# Patient Record
Sex: MALE | Race: OTHER | HISPANIC OR LATINO | URBAN - METROPOLITAN AREA
[De-identification: names, ages, dates, MRNs, and addresses within clinical notes are randomized per-mention and may not be internally consistent; named-entity substitution may affect disease eponyms.]

---

## 2023-11-01 ENCOUNTER — EMERGENCY (EMERGENCY)
Facility: HOSPITAL | Age: 43
LOS: 1 days | Discharge: HOME | End: 2023-11-01
Attending: EMERGENCY MEDICINE
Payer: MEDICAID

## 2023-11-01 VITALS
HEART RATE: 91 BPM | DIASTOLIC BLOOD PRESSURE: 97 MMHG | RESPIRATION RATE: 20 BRPM | WEIGHT: 199.96 LBS | TEMPERATURE: 98 F | HEIGHT: 65 IN | OXYGEN SATURATION: 95 % | SYSTOLIC BLOOD PRESSURE: 156 MMHG

## 2023-11-01 PROCEDURE — 99223 1ST HOSP IP/OBS HIGH 75: CPT

## 2023-11-01 PROCEDURE — 72148 MRI LUMBAR SPINE W/O DYE: CPT | Mod: 26,MA

## 2023-11-01 PROCEDURE — 72131 CT LUMBAR SPINE W/O DYE: CPT | Mod: 26,MA

## 2023-11-01 RX ORDER — GABAPENTIN 400 MG/1
300 CAPSULE ORAL ONCE
Refills: 0 | Status: COMPLETED | OUTPATIENT
Start: 2023-11-01 | End: 2023-11-01

## 2023-11-01 RX ORDER — KETOROLAC TROMETHAMINE 30 MG/ML
30 SYRINGE (ML) INJECTION EVERY 6 HOURS
Refills: 0 | Status: COMPLETED | OUTPATIENT
Start: 2023-11-01 | End: 2023-11-06

## 2023-11-01 RX ORDER — OXYCODONE HYDROCHLORIDE 5 MG/1
10 TABLET ORAL ONCE
Refills: 0 | Status: DISCONTINUED | OUTPATIENT
Start: 2023-11-01 | End: 2023-11-01

## 2023-11-01 RX ORDER — ACETAMINOPHEN 500 MG
975 TABLET ORAL EVERY 6 HOURS
Refills: 0 | Status: DISCONTINUED | OUTPATIENT
Start: 2023-11-01 | End: 2023-11-08

## 2023-11-01 RX ORDER — ACETAMINOPHEN 500 MG
1000 TABLET ORAL ONCE
Refills: 0 | Status: COMPLETED | OUTPATIENT
Start: 2023-11-01 | End: 2023-11-01

## 2023-11-01 RX ORDER — METHOCARBAMOL 500 MG/1
750 TABLET, FILM COATED ORAL EVERY 8 HOURS
Refills: 0 | Status: DISCONTINUED | OUTPATIENT
Start: 2023-11-01 | End: 2023-11-08

## 2023-11-01 RX ORDER — GABAPENTIN 400 MG/1
300 CAPSULE ORAL THREE TIMES A DAY
Refills: 0 | Status: DISCONTINUED | OUTPATIENT
Start: 2023-11-01 | End: 2023-11-08

## 2023-11-01 RX ORDER — DEXAMETHASONE 0.5 MG/5ML
10 ELIXIR ORAL ONCE
Refills: 0 | Status: COMPLETED | OUTPATIENT
Start: 2023-11-01 | End: 2023-11-01

## 2023-11-01 RX ORDER — LIDOCAINE 4 G/100G
1 CREAM TOPICAL ONCE
Refills: 0 | Status: COMPLETED | OUTPATIENT
Start: 2023-11-01 | End: 2023-11-01

## 2023-11-01 RX ORDER — KETOROLAC TROMETHAMINE 30 MG/ML
30 SYRINGE (ML) INJECTION ONCE
Refills: 0 | Status: DISCONTINUED | OUTPATIENT
Start: 2023-11-01 | End: 2023-11-01

## 2023-11-01 RX ORDER — DIAZEPAM 5 MG
5 TABLET ORAL ONCE
Refills: 0 | Status: DISCONTINUED | OUTPATIENT
Start: 2023-11-01 | End: 2023-11-01

## 2023-11-01 RX ADMIN — GABAPENTIN 300 MILLIGRAM(S): 400 CAPSULE ORAL at 08:45

## 2023-11-01 RX ADMIN — Medication 30 MILLIGRAM(S): at 07:31

## 2023-11-01 RX ADMIN — OXYCODONE HYDROCHLORIDE 10 MILLIGRAM(S): 5 TABLET ORAL at 10:10

## 2023-11-01 RX ADMIN — Medication 30 MILLIGRAM(S): at 17:08

## 2023-11-01 RX ADMIN — Medication 30 MILLIGRAM(S): at 23:52

## 2023-11-01 RX ADMIN — Medication 5 MILLIGRAM(S): at 07:30

## 2023-11-01 RX ADMIN — LIDOCAINE 1 PATCH: 4 CREAM TOPICAL at 07:31

## 2023-11-01 RX ADMIN — GABAPENTIN 300 MILLIGRAM(S): 400 CAPSULE ORAL at 23:14

## 2023-11-01 RX ADMIN — Medication 30 MILLIGRAM(S): at 08:37

## 2023-11-01 RX ADMIN — GABAPENTIN 300 MILLIGRAM(S): 400 CAPSULE ORAL at 07:31

## 2023-11-01 RX ADMIN — Medication 400 MILLIGRAM(S): at 07:30

## 2023-11-01 RX ADMIN — OXYCODONE HYDROCHLORIDE 10 MILLIGRAM(S): 5 TABLET ORAL at 08:45

## 2023-11-01 RX ADMIN — Medication 1000 MILLIGRAM(S): at 07:57

## 2023-11-01 RX ADMIN — Medication 1000 MILLIGRAM(S): at 08:37

## 2023-11-01 RX ADMIN — Medication 10 MILLIGRAM(S): at 08:32

## 2023-11-01 RX ADMIN — GABAPENTIN 300 MILLIGRAM(S): 400 CAPSULE ORAL at 13:56

## 2023-11-01 RX ADMIN — LIDOCAINE 1 PATCH: 4 CREAM TOPICAL at 19:20

## 2023-11-01 RX ADMIN — METHOCARBAMOL 750 MILLIGRAM(S): 500 TABLET, FILM COATED ORAL at 13:57

## 2023-11-01 RX ADMIN — Medication 102 MILLIGRAM(S): at 07:52

## 2023-11-01 NOTE — ED PROVIDER NOTE - CARE PROVIDER_API CALL
Laury Grover  Pain Medicine  [address] [place] [address], [address] [place] [address]  Phone: [phone]  Fax: [phone]  Follow Up Time:

## 2023-11-01 NOTE — ED PROVIDER NOTE - PROGRESS NOTE DETAILS
CT +herniated discs, d/w patient results, reports pain is improved from 10/10 to 7/10 after multiple modalities of pain control.  Pain management consult placed, MD Grover recommending MRI.

## 2023-11-01 NOTE — CONSULT NOTE ADULT - ASSESSMENT
43M  acute back and leg pain x 2 weeks  back pain likely sacroilitis  leg pain likely lumbar radiculopathy, mri pending      - Recommend starting acetaminophen PO 975mg [id number] standing. HOLD for liver function test dysfunction  - Recommend starting gabapentin PO 300mg [id number] standing. HOLD for sedation  - If no contraindication to NSAIDs, recommend starting ketorolac IV 30mg [id number] standing x 4 doses.   - Recommend starting robaxin 750mg q8h standing. HOLD for sedation

## 2023-11-01 NOTE — ED ADULT NURSE NOTE - OBJECTIVE STATEMENT
Pt is A&Ox4. Respirations are even and unlabored. Color is appropriate for race. Skin warm and dry. Pt reports right sided back pain radiating to right leg for a week.  Denies trauma/injury to area. Last Motrin 0300.  Patient unable sit on right side.  Ambulates with limp. ED provider evaluating, plan of care ongoing.

## 2023-11-01 NOTE — ED PROVIDER NOTE - PHYSICAL EXAMINATION
Constitutional - well-developed; well nourished. Head - NCAT. Airway patent. Eyes - PERRL. CV - RRR. no murmur. no edema. Pulm - CTAB. Abd - soft, nt. no rebound. no guarding. Neuro - A&Ox3. strength 5/5 x4. sensation intact x4. normal gait. Skin - No rash. MSK - +TTP to R paraspinal lumbar spine, no midline tenderness, no skin changes, able to walk, no pain with lifting up legs, motor and sensory sensation intact, DP and PT pulses intact, calf soft NT, normal ROM.

## 2023-11-01 NOTE — ED CDU PROVIDER INITIAL DAY NOTE - ATTENDING APP SHARED VISIT CONTRIBUTION OF CARE
I agree with the PA's note and was available for any issues/concerns. I was directly involved in patient care. My brief overall assessment is as follows:    43 year old male PMHx herniated discs c/o back pain; initial work up reviewed; admit to obs for pain control and MRI

## 2023-11-01 NOTE — ED PROVIDER NOTE - CLINICAL SUMMARY MEDICAL DECISION MAKING FREE TEXT BOX
back pain, neurologically intact: pain control, as patient reports 10/10, ambulatory  if pain improved likely can discharge on oral pain regimen with steroids and muscle relaxants  suspect radiculopathy vs herniated discs  refer to spine and pain management as outpatient back pain, neurologically intact: pain control, as patient reports 10/10, ambulatory  if pain improved likely can discharge on oral pain regimen with steroids and muscle relaxants  suspect radiculopathy vs herniated discs  refer to spine and pain management as outpatient    patient reporting increased pain, improved from 10/10-7/10  added on oxycodone and more gabapentin  pain management consult placed, advised MRI, will place in observation for possible MELY

## 2023-11-01 NOTE — ED CDU PROVIDER INITIAL DAY NOTE - CLINICAL SUMMARY MEDICAL DECISION MAKING FREE TEXT BOX
back pain with herniated disc, received multiple drug modalities for pain in ED reports pain improved from 10/10-7/10  pain management consult placed, as per MD Grover recommends MRI for possible MELY

## 2023-11-01 NOTE — ED ADULT TRIAGE NOTE - CHIEF COMPLAINT QUOTE
Patient presents to ED with c/o right sided back pain radiating to right leg times 5 days.  Denies trauma/injury Last Motrin 0300  Patient unable sit on right side.  Ambulates in triage with limp.

## 2023-11-01 NOTE — ED CDU PROVIDER INITIAL DAY NOTE - OBJECTIVE STATEMENT
This is a 43 year old male here for lower back pain radiating down right leg x 1 week.  Denies any trauma or falls.  He denies any h/o cancer or steroid use.  He has been taking tylenol and motrin with no relief and gave himself an injection x 3 days ago, called dolor nebulion from [place].  He reports no loss in bowel or bladder dysfunction.  He notes no fevers, chills, n/v/d or any recent travel or rashes.  He son drove him to the ED.  He reports pain increases with movement and laying/sitting for prolonged amount of time.  He reports issues with back in past, never formally evaluated by spine or pain management.  He denies any pmhx or shx.  He denies any allergies. This is a 43 year old male here for lower back pain radiating down right leg x 1 week.  Denies any trauma or falls.  He denies any h/o cancer or steroid use.  He has been taking tylenol and motrin with no relief and gave himself an injection x 3 days ago, called dolor nebulion from [place].  He reports no loss in bowel or bladder dysfunction.  He notes no fevers, chills, n/v/d or any recent travel or rashes.  He son drove him to the ED.  He reports pain increases with movement and laying/sitting for prolonged amount of time.  He reports issues with back in past, never formally evaluated by spine or pain management.  He denies any pmhx or shx.  He denies any allergies.    Patient denies smoking  Family history DM: Father

## 2023-11-01 NOTE — CONSULT NOTE ADULT - SUBJECTIVE AND OBJECTIVE BOX
CC: back pain    HPI: per chart review:  · HPI Objective Statement: This is a 43 year old male here for lower back pain radiating down right leg x 1 week.  Denies any trauma or falls.  He denies any h/o cancer or steroid use.  He has been taking tylenol and motrin with no relief and gave himself an injection x 3 days ago, called unique montero from [place].  He reports no loss in bowel or bladder dysfunction.  He notes no fevers, chills, n/v/d or any recent travel or rashes.  He son drove him to the ED.  He reports pain increases with movement and laying/sitting for prolonged amount of time.  He reports issues with back in past, never formally evaluated by spine or pain management.  He denies any pmhx or shx.  He denies any allergies.    	Patient denies smoking  Family history DM: Father        Analgesic Dosing for past 24 hours reviewed as below:  acetaminophen   IVPB ..   400 mL/Hr IV Intermittent (11-01-23 @ 07:30)    diazepam    Tablet   5 milliGRAM(s) Oral (11-01-23 @ 07:30)    gabapentin   300 milliGRAM(s) Oral (11-01-23 @ 07:31)    gabapentin   300 milliGRAM(s) Oral (11-01-23 @ 08:45)    ketorolac   Injectable   30 milliGRAM(s) IV Push (11-01-23 @ 07:31)    oxyCODONE    IR   10 milliGRAM(s) Oral (11-01-23 @ 08:45)          T(C): 36.6 (11-01-23 @ 10:57), Max: 36.8 (11-01-23 @ 06:12)  HR: 84 (11-01-23 @ 10:57) (84 - 91)  BP: 137/81 (11-01-23 @ 10:57) (137/81 - 156/97)  RR: 20 (11-01-23 @ 10:57) (20 - 20)  SpO2: 97% (11-01-23 @ 10:57) (95% - 97%)        gabapentin 300 milliGRAM(s) Oral three times a day  ketorolac   Injectable 30 milliGRAM(s) IV Push every 6 hours  methocarbamol 750 milliGRAM(s) Oral every 8 hours PRN                  Pain Service   [phone]

## 2023-11-01 NOTE — ED ADULT NURSE NOTE - CAS ELECT INFOMATION PROVIDED
Patient A&Ox4, resp even/unlabored, ambulatory, steady gait noted, discharged home with all belongings. Med rec and discharge instructions explained and given by RICHIE White. Patient verbalizes understanding on physician follow up, medication regimen and next dose, s/s of complications and monitoring. No distress noted./DC instructions

## 2023-11-02 VITALS
RESPIRATION RATE: 18 BRPM | HEART RATE: 78 BPM | SYSTOLIC BLOOD PRESSURE: 117 MMHG | DIASTOLIC BLOOD PRESSURE: 70 MMHG | TEMPERATURE: 98 F | OXYGEN SATURATION: 98 %

## 2023-11-02 PROCEDURE — 96365 THER/PROPH/DIAG IV INF INIT: CPT

## 2023-11-02 PROCEDURE — 96376 TX/PRO/DX INJ SAME DRUG ADON: CPT

## 2023-11-02 PROCEDURE — 99284 EMERGENCY DEPT VISIT MOD MDM: CPT | Mod: 25

## 2023-11-02 PROCEDURE — 72148 MRI LUMBAR SPINE W/O DYE: CPT | Mod: MA

## 2023-11-02 PROCEDURE — 99239 HOSP IP/OBS DSCHRG MGMT >30: CPT

## 2023-11-02 PROCEDURE — 96367 TX/PROPH/DG ADDL SEQ IV INF: CPT

## 2023-11-02 PROCEDURE — 96375 TX/PRO/DX INJ NEW DRUG ADDON: CPT

## 2023-11-02 PROCEDURE — G0378: CPT

## 2023-11-02 PROCEDURE — T1013: CPT

## 2023-11-02 PROCEDURE — 72131 CT LUMBAR SPINE W/O DYE: CPT | Mod: MA

## 2023-11-02 RX ORDER — METHOCARBAMOL 500 MG/1
1 TABLET, FILM COATED ORAL
Qty: 15 | Refills: 0
Start: 2023-11-02 | End: 2023-11-06

## 2023-11-02 RX ORDER — ACETAMINOPHEN 500 MG
3 TABLET ORAL
Qty: 45 | Refills: 0
Start: 2023-11-02 | End: 2023-11-06

## 2023-11-02 RX ORDER — GABAPENTIN 400 MG/1
1 CAPSULE ORAL
Qty: 15 | Refills: 0
Start: 2023-11-02 | End: 2023-11-06

## 2023-11-02 RX ADMIN — METHOCARBAMOL 750 MILLIGRAM(S): 500 TABLET, FILM COATED ORAL at 15:59

## 2023-11-02 RX ADMIN — Medication 30 MILLIGRAM(S): at 12:31

## 2023-11-02 RX ADMIN — GABAPENTIN 300 MILLIGRAM(S): 400 CAPSULE ORAL at 15:58

## 2023-11-02 RX ADMIN — Medication 30 MILLIGRAM(S): at 06:29

## 2023-11-02 RX ADMIN — Medication 30 MILLIGRAM(S): at 00:28

## 2023-11-02 RX ADMIN — GABAPENTIN 300 MILLIGRAM(S): 400 CAPSULE ORAL at 06:28

## 2023-11-02 RX ADMIN — Medication 30 MILLIGRAM(S): at 07:22

## 2023-11-02 RX ADMIN — Medication 975 MILLIGRAM(S): at 15:58

## 2023-11-02 NOTE — ED CDU PROVIDER SUBSEQUENT DAY NOTE - PROGRESS NOTE DETAILS
spoke to MD Grover, only availability for one MELY case today, possibility patient may have procedure tomorrow will continue to monitor.

## 2023-11-02 NOTE — ED CDU PROVIDER SUBSEQUENT DAY NOTE - CLINICAL SUMMARY MEDICAL DECISION MAKING FREE TEXT BOX
42yo M presented to ED for sacroiliitis, lumbar radiculopathy. CT revealed mild disc protrusion L4-L5 and L5-S1 with deviation of L S1 nerve. no focal neuro deficits. pt placed in obs for pain management and further eval. pain man consulted, as per MD Grover recommended MRI for possible MELY. MRI confirmed findings of CT, additionally revealed osteophyte and possible encroachment on L5-L4 nerve. pending reassessment by pain management.

## 2023-11-02 NOTE — ED CDU PROVIDER SUBSEQUENT DAY NOTE - HISTORY
Pt resting comfortably at time of re-assessment. No events overnight. Pending reassessment by pain management. Will continue to monitor.

## 2023-11-02 NOTE — ED CDU PROVIDER DISPOSITION NOTE - CLINICAL COURSE
This is a 43 year old male here for lower back pain radiating down right leg x 1 week.  Denies any trauma or falls.  He denies any h/o cancer or steroid use.  He has been taking tylenol and motrin with no relief and gave himself an injection x 3 days ago, called unique montero from [place].  He reports no loss in bowel or bladder dysfunction.  He notes no fevers, chills, n/v/d or any recent travel or rashes.  He son drove him to the ED.  He reports pain increases with movement and laying/sitting for prolonged amount of time.  He reports issues with back in past, never formally evaluated by spine or pain management.  He denies any pmhx or shx.  He denies any allergies.  Patient placed into observation for intractable pain, seen by pain management offered MELY, however due to unavailability of OR time, patient placed as an add on for tomorrow at 2pm.  As per MD Grover case may be cancelled if two cases are prioritized.  Patient prefers not to stay in ED, and follow up as outpatient.  He reports pain improved, ambulating better, rates pain as 4/10.  Patient given copies of all results.  Understands and agrees to proceed.  RX medication to [facility].

## 2023-11-02 NOTE — ED CDU PROVIDER DISPOSITION NOTE - CARE PROVIDER_API CALL
Laury Grover  Pain Medicine  [address] [facility], [place] [address]  Phone: [phone]  Fax: [phone]  Follow Up Time:     Dean Gutierrezul  Neurosurgery  [address] [place] [id number]  Phone: [phone]  Fax: [phone]  Follow Up Time:

## 2023-11-02 NOTE — ED CDU PROVIDER DISPOSITION NOTE - NSFOLLOWUPINSTRUCTIONS_ED_ALL_ED_FT
Back Pain    Back pain is very common in adults. The cause of back pain is rarely dangerous and the pain often gets better over time. The cause of your back pain may not be known and may include strain of muscles or ligaments, degeneration of the spinal disks, or arthritis. Occasionally the pain may radiate down your leg(s). Over-the-counter medicines to reduce pain and inflammation are often the most helpful. Stretching and remaining active frequently helps the healing process.     SEEK IMMEDIATE MEDICAL CARE IF YOU HAVE ANY OF THE FOLLOWING SYMPTOMS: bowel or bladder control problems, unusual weakness or numbness in your arms or legs, nausea or vomiting, abdominal pain, fever, dizziness/lightheadedness.      Please follow up with pain management    Take medications as prescribed    Return if symptoms worsen or persist

## 2023-11-02 NOTE — ED ADULT NURSE REASSESSMENT NOTE - NS ED NURSE REASSESS COMMENT FT1
Assumed care of patient at 07:15 from DEIRK Novak. Charting as noted. Patient A&Ox4, resp even/unlabored, on room air, MAEx4, skin warm, dry, intact, presents to ED c/o back pain. At time of assessment, patient denies pain/discomfort, denies CP/SOB, denies any further complaints. Patient updated on the plan of care, awaiting pain management and further evaluation. Stretcher locked in lowest position, IV site flushed w/ NS. No redness, swelling or pain noted to site. No signs of acute distress noted, safety maintained. Emotional support provided.
Assumed care of patient at this time, received report from Kayyhiandrei Reddy, patient is awake, calm, RR even and unlabored, denies sob, denies chest pain, patient reports mild pain at the moment in lower back, patient aware of plan of care, family at bedside.
Patient A&Ox4, resp even/unlabored, ambulatory, steady gait noted, discharged home with all belongings. Med rec and discharge instructions explained and given by RICHIE White. Patient verbalizes understanding on physician follow up, medication regimen and next dose, s/s of complications and monitoring. No distress noted.
Safety check done hourly and pt's safety maintained. No acute distress or change in mental status noted.
Pt is resting in bed comfortably at this time, no apparent distress or change in mental status noted at this time. Pt safety maintained. Pt denies any complaints at this time. Pt updated on plan of care.
Assumed care of pt at 1133 as stated in report from RN COLLIN. Charting as noted. Patient A&O x4, denies pain/discomfort, denies CP/SOB. Updated on the plan of care. Call bell within reach, bed locked in lowest position. IV site flushed w/ NS. No redness, swelling or pain noted to site. No signs of acute distress noted, safety maintained.

## 2023-11-02 NOTE — ED ADULT NURSE REASSESSMENT NOTE - GENERAL PATIENT STATE
comfortable appearance/smiling/interactive
comfortable appearance/improvement verbalized
comfortable appearance/resting/sleeping

## 2023-11-02 NOTE — ED CDU PROVIDER DISPOSITION NOTE - PATIENT PORTAL LINK FT
You can access the FollowMyHealth Patient Portal offered by [facility] by registering at the following website: [email or web address]. By joining [email or web address]’s FollowMyHealth portal, you will also be able to view your health information using other applications (apps) compatible with our system.

## 2024-08-12 ENCOUNTER — EMERGENCY (EMERGENCY)
Facility: HOSPITAL | Age: 44
LOS: 1 days | Discharge: HOME | End: 2024-08-12
Attending: EMERGENCY MEDICINE
Payer: MEDICAID

## 2024-08-12 VITALS
RESPIRATION RATE: 16 BRPM | OXYGEN SATURATION: 97 % | SYSTOLIC BLOOD PRESSURE: 125 MMHG | HEIGHT: 66 IN | TEMPERATURE: 98 F | WEIGHT: 169.98 LBS | HEART RATE: 93 BPM | DIASTOLIC BLOOD PRESSURE: 78 MMHG

## 2024-08-12 PROCEDURE — 99284 EMERGENCY DEPT VISIT MOD MDM: CPT

## 2024-08-12 PROCEDURE — 99283 EMERGENCY DEPT VISIT LOW MDM: CPT

## 2024-08-12 PROCEDURE — T1013: CPT

## 2024-08-12 PROCEDURE — 96372 THER/PROPH/DIAG INJ SC/IM: CPT

## 2024-08-12 RX ORDER — IBUPROFEN 200 MG
1 TABLET ORAL
Qty: 28 | Refills: 0
Start: 2024-08-12 | End: 2024-08-18

## 2024-08-12 RX ORDER — KETOROLAC TROMETHAMINE 10 MG
30 TABLET ORAL ONCE
Refills: 0 | Status: DISCONTINUED | OUTPATIENT
Start: 2024-08-12 | End: 2024-08-12

## 2024-08-12 RX ORDER — LIDOCAINE 5% 5 G/100G
1 CREAM TOPICAL ONCE
Refills: 0 | Status: COMPLETED | OUTPATIENT
Start: 2024-08-12 | End: 2024-08-12

## 2024-08-12 RX ORDER — METHOCARBAMOL 500 MG
1500 TABLET ORAL ONCE
Refills: 0 | Status: COMPLETED | OUTPATIENT
Start: 2024-08-12 | End: 2024-08-12

## 2024-08-12 RX ORDER — METHOCARBAMOL 500 MG
1 TABLET ORAL
Qty: 20 | Refills: 0
Start: 2024-08-12 | End: 2024-08-16

## 2024-08-12 RX ORDER — BACTERIOSTATIC SODIUM CHLORIDE 0.9 %
1000 VIAL (ML) INJECTION ONCE
Refills: 0 | Status: COMPLETED | OUTPATIENT
Start: 2024-08-12 | End: 2024-08-12

## 2024-08-12 RX ADMIN — Medication 30 MILLIGRAM(S): at 14:39

## 2024-08-12 RX ADMIN — LIDOCAINE 5% 1 PATCH: 5 CREAM TOPICAL at 14:39

## 2024-08-12 RX ADMIN — Medication 1500 MILLIGRAM(S): at 14:39

## 2024-08-12 NOTE — ED PROVIDER NOTE - CLINICAL SUMMARY MEDICAL DECISION MAKING FREE TEXT BOX
42yo M presenting with right lower back pain x 2 weeks, atraumatic. Pain worse with movement, reproducible on exam. NV intact. no bowel/bladder dysfunction. FWB and ambulatory with steady gait. Hx similar pattern of pain. presentation consistent with muscular low back pain with sciatica. Tx symptoms with toradol, robaxin and lidocaine patch and advise outpt f/u with spine. return precautions discussed 42yo M presenting with right lower back pain x 2 weeks, atraumatic. Pain worse with movement, reproducible on exam. NV intact. no bowel/bladder dysfunction. FWB and ambulatory with steady gait. Hx similar pattern of pain. presentation consistent with muscular low back pain with sciatica. Tx symptoms with toradol, robaxin and lidocaine patch and advise outpt f/u with spine. pt requested IV fluids as he works outside in heat. Given 1L NS. return precautions discussed

## 2024-08-12 NOTE — ED PROVIDER NOTE - PHYSICAL EXAMINATION
Gen: No acute distress, non toxic  Head: NCAT  Eyes: pink conjunctivae, EOMI, PERRL  GI: Abdomen soft, NT, ND. No rebound, no guarding  : No CVAT  Neuro: A&O x 3, sensorimotor intact without deficits, 5/5 str b/l LE  MSK: No midline c/t/l spine ttp or step offs. Full ROM ext x 4. +pain with right straight leg raise. +TTP right paraspinal lumbosacral region and R SI joint. FWB and ambulatory with steady gait  Vasc: DP pulses 2+ b/l  Skin: No rashes. intact and perfused.

## 2024-08-12 NOTE — ED PROVIDER NOTE - NSFOLLOWUPINSTRUCTIONS_ED_ALL_ED_FT
- Follow up with your doctor within 2-3 days.   - Return to the ED for any new or worsening symptoms included but not limited to weakness, urinary symptoms, numbness, difficulty walking, bowel/bladder incontinence, fevers, etc  - Avoid heavy lifting, significant exertion  - Apply heating pads/warm compresses to affected area while resting   - May follow-up with spine doctor listed for further evaluation if symptoms persist    Back Pain    Back pain is very common in adults. The cause of back pain is rarely dangerous and the pain often gets better over time. The cause of your back pain may not be known and may include strain of muscles or ligaments, degeneration of the spinal disks, or arthritis. Occasionally the pain may radiate down your leg(s). Over-the-counter medicines to reduce pain and inflammation are often the most helpful. Stretching and remaining active frequently helps the healing process.     SEEK IMMEDIATE MEDICAL CARE IF YOU HAVE ANY OF THE FOLLOWING SYMPTOMS: bowel or bladder control problems, unusual weakness or numbness in your arms or legs, nausea or vomiting, abdominal pain, fever, dizziness/lightheadedness.     - Onel un seguimiento con wolf médico dentro de 2-3 días.   - Regrese al servicio de urgencias si presenta cualquier síntoma nuevo o que empeore, incluidos, entre otros, debilidad, síntomas urinarios, entumecimiento, dificultad para caminar, incontinencia intestinal/vejiga, fiebre, etc.  - Evite levantar objetos pesados ??y realizar esfuerzos significativos.  - Aplique almohadillas térmicas/compresas tibias en el área afectada mientras descansa   - Puede realizar un seguimiento con el médico de columna indicado para young evaluación adicional si los síntomas persisten.    dolor de espalda    El dolor de espalda es muy común en los adultos. La causa del dolor de espalda amador vez es peligrosa y el dolor suele mejorar con el tiempo. Es posible que se desconozca la causa de wolf dolor de espalda y puede incluir distensión de músculos o ligamentos, degeneración de los discos espinales o artritis. Ocasionalmente, el dolor puede irradiarse hacia las piernas. Los medicamentos de venta justin para reducir el dolor y la inflamación suelen ser los más útiles. Estirarse y permanecer activo con frecuencia ayuda al proceso de curación.     BUSQUE ATENCIÓN MÉDICA INMEDIATA SI TIENE ALGUNO DE LOS SIGUIENTES SÍNTOMAS: problemas de control de los intestinos o la vejiga, debilidad inusual o entumecimiento en los brazos o las piernas, náuseas o vómitos, dolor abdominal, fiebre, mareos o aturdimiento.

## 2024-08-12 NOTE — ED ADULT NURSE NOTE - OBJECTIVE STATEMENT
a&ox4 c/o worsening lower back pain "sharp and sometimes goes down my leg"  x 2 weeks. pt states he has a hx of sciatica   denies injury/falls, pain worse w/movement. + ambulatory w/ pain + able to bear weight

## 2024-08-12 NOTE — ED PROVIDER NOTE - OBJECTIVE STATEMENT
43-year-old male with PMHx diabetes presents ED complaining of right lower back pain rating down right leg x 2 weeks.  No specific injury or trauma but states he works in [employer] and frequently doing heavy lifting and a lot of physical activity.  Taking Motrin intermittently which helps pain, last dose 2 days ago.  Reports history of similar pattern of back pain however states this episode is not as painful as previous. Pt also reports chronic neuropathy to feet, not worse than usual. Denies fever, chills, fall, trauma, numbness, weakness, bowel/bladder dysfunction, urinary symptoms, difficulty ambulating.   : Rigoberto Eastman.

## 2024-08-12 NOTE — ED PROVIDER NOTE - CARE PROVIDER_API CALL
Justice Raza  Neurosurgery  [address] [place] [id number]  Phone: [phone]  Fax: [phone]  Follow Up Time:

## 2024-08-12 NOTE — ED PROVIDER NOTE - ATTENDING APP SHARED VISIT CONTRIBUTION OF CARE
H[facility] : [facility]  Reports pain in right lower back for the past 2 weeks.  Fluctuating intensity, worse with certain movements; occasional difficulty getting out of bed.  No radiation into the leg.  No bowel or bladder dysfunction.    Taking  Motrin once a day.  Reports numbness over dorsum of right foot and right third toe since March.  History of diabetes on metformin 1000 mg twice a day. denies abdominal pain.  Denies fever.  Denies weight loss.  Physical examination: Nontoxic-appearing, no acute distress, no midline tenderness palpation lumbar or thoracic spine, L4-S1 motor/sensory 5/5 and equal bilateral, nonlocalized tenderness palpation right paravertebral muscles, normal gait.  A/P nonspecific low back pain.  Anticipatory guidance provided

## 2024-08-12 NOTE — ED ADULT NURSE NOTE - CHIEF COMPLAINT
Called patient's spouse and let him know that the procedure has started.   The patient is a 43y Male complaining of back pain/injury.